# Patient Record
Sex: MALE | Race: WHITE | URBAN - METROPOLITAN AREA
[De-identification: names, ages, dates, MRNs, and addresses within clinical notes are randomized per-mention and may not be internally consistent; named-entity substitution may affect disease eponyms.]

---

## 2019-01-18 ENCOUNTER — OFFICE VISIT (OUTPATIENT)
Dept: FAMILY MEDICINE | Facility: CLINIC | Age: 44
End: 2019-01-18
Payer: COMMERCIAL

## 2019-01-18 VITALS — SYSTOLIC BLOOD PRESSURE: 165 MMHG | DIASTOLIC BLOOD PRESSURE: 95 MMHG | HEART RATE: 74 BPM | OXYGEN SATURATION: 99 %

## 2019-01-18 DIAGNOSIS — R03.0 ELEVATED BLOOD PRESSURE READING WITHOUT DIAGNOSIS OF HYPERTENSION: ICD-10-CM

## 2019-01-18 DIAGNOSIS — R07.9 CHEST PAIN, UNSPECIFIED TYPE: Primary | ICD-10-CM

## 2019-01-18 PROCEDURE — 99205 OFFICE O/P NEW HI 60 MIN: CPT | Performed by: FAMILY MEDICINE

## 2019-01-18 PROCEDURE — 93000 ELECTROCARDIOGRAM COMPLETE: CPT | Performed by: FAMILY MEDICINE

## 2019-01-18 NOTE — PROGRESS NOTES
Patient presents with severe chest pains to clinic.    Patient states he took otc Tums and Baking Soda mixed with water to relieve chest pains at around 2:30AM in the morning with no relief.    SUBJECTIVE:  Here today as a walk-in for chest pain that started around 230 this morning.  We have not seen patient in clinic previously but he denies any significant medical history at all.  No history of surgery.  No medications.  No allergies.  Is typically in good health and felt good when he went to bed last night with no recent illness, exposure, etc.  But he woke in the middle the night with a severe pain in his lower chest/upper abdomen.  Denies that it made him short of breath but he was feeling somewhat nauseated and tried to make himself throw up.  This did not change much.  No bowel movement.  The pain is ongoing does not really change much.  Felt a little bit better with taking a hot shower but then symptoms came back.  No fevers or chills or any other constitutional symptoms.  No headache or visual changes.  No lateralizing neurologic symptoms.  No palpitations.    Review of systems otherwise negative.  Past medical, family, and social history reviewed and updated in chart.    OBJECTIVE:  BP (!) 165/95   Pulse 74   SpO2 99%   Alert and pleasant but appears somewhat pale and very uncomfortable.  Holding his upper abdomen.  Oxygen was started  Eye exam is normal - BALBINA, EOMI, fundi normal, corneas normal, no foreign bodies, visual acuity normal both eyes, no periorbital cellulitis.  Ears normal. Throat and pharynx normal. Neck supple. No adenopathy or masses in the neck or supraclavicular regions. Sinuses non tender.  S1 and S2 normal, no murmurs, clicks, gallops or rubs. Regular rate and rhythm. Chest is clear; no wheezes or rales. No edema or JVD.  Abdomen -soft throughout with no rebound and no guarding but deep into his abdomen he is somewhat tender.  No masses or pulsations noted  I note only benign skin  findings. No unusual rashes or suspicious skin lesions noted. Nails appear normal.   No lab work for review  EKG shows a normal sinus rhythm but there is some nonspecific T wave changes and some low voltage throughout the leads    ASSESSMENT / PLAN:  (R07.9) Chest pain  (primary encounter diagnosis)  Comment: At this point I do not have an explanation for his pain but he is very uncomfortable and I am concerned that this may represent more acute pathology up to and including inferior or posterior ischemia, abdominal aortic issues, or intestinal pathology.  I feel strongly that his needed level of care is much higher than we can offer in the clinic.  He needs a full evaluation including cardiac testing and imaging and this would best be done in the emergency department.  I do not feel it is safe for him to drive or to be transported by his wife and therefore we contacted the ambulance and he will be brought to the hospital for acute evaluation.  Plan: EKG 12-lead complete w/read - Clinics            (R03.0) Elevated blood pressure reading without diagnosis of hypertension  Comment: As above  Plan:     Follow up -directly to the hospital  KACEY Galvan MD    (Chart documentation completed in part with Dragon voice-recognition software.  Even though reviewed some grammatical, spelling, and word errors may remain.)